# Patient Record
Sex: MALE | Race: BLACK OR AFRICAN AMERICAN
[De-identification: names, ages, dates, MRNs, and addresses within clinical notes are randomized per-mention and may not be internally consistent; named-entity substitution may affect disease eponyms.]

---

## 2020-02-10 ENCOUNTER — HOSPITAL ENCOUNTER (EMERGENCY)
Dept: HOSPITAL 74 - FER | Age: 25
Discharge: HOME | End: 2020-02-10
Payer: COMMERCIAL

## 2020-02-10 VITALS — HEART RATE: 60 BPM | DIASTOLIC BLOOD PRESSURE: 77 MMHG | SYSTOLIC BLOOD PRESSURE: 129 MMHG

## 2020-02-10 VITALS — BODY MASS INDEX: 31.5 KG/M2

## 2020-02-10 VITALS — TEMPERATURE: 98.4 F

## 2020-02-10 DIAGNOSIS — R11.2: Primary | ICD-10-CM

## 2020-02-10 DIAGNOSIS — Z91.013: ICD-10-CM

## 2020-02-10 DIAGNOSIS — R19.7: ICD-10-CM

## 2020-02-10 NOTE — PDOC
Attending Attestation





- Resident


Resident Name: SkyDejan





- ED Attending Attestation


I have performed the following: I have examined & evaluated the patient, The 

case was reviewed & discussed with the resident, I agree w/resident's findings 

& plan, Exceptions are as noted





- HPI


HPI: 





02/10/20 14:15


Healthy male with 4-day history of nausea vomiting and diarrhea.  He has been 

able to tolerate small amounts of p.o. fluid, but any food intake precipitates 

vomiting and diarrhea.  No lightheadedness, dizziness, abdominal pain, 

hematemesis, melena, bloody stool, fever or chills.





- Physicial Exam


PE: 





02/10/20 14:16


PE: Afebrile, vital signs stable including orthostatics


HEENT normal


Neck supple without bruit mass or nodes


Chest clear


CV regular without murmur rub or gallop.  No tachycardia


Abdomen nondistended.  Bowel sounds normal.  Soft without mass tenderness 

organomegaly


Adequate skin turgor, wet mucous membranes





- Medical Decision Making





02/10/20 14:17


Assessment: Viral gastroenteritis, no acute abdominal pain, no sign of 

significant dehydration





Plan: Antiemetic and antidiarrheal, p.o. hydration, observe.


02/10/20 15:11


Tolerating p.o. fluids and applesauce, no vomiting.  Mild nausea, however, 

persists.  Abdomen remains soft and nontender


Discharged on clear liquids, Zofran, and Imodium.  Return to ER if there is 

fever or abdominal pain.  Otherwise follow-up primary physician.  Fully 

ambulatory and in no severe distress at discharge to follow-up as directed

## 2020-02-10 NOTE — PDOC
History of Present Illness





- General


Chief Complaint: Vomiting/Diarrhea


Stated Complaint: diarrhea/vomiting


Time Seen by Provider: 02/10/20 13:16


History Source: Patient





- History of Present Illness


Initial Comments: 





Mr. Metz is a 25 y/o man w/hx sickle cell trait p/w four days of ongoing 

N/V/D. He reports x3 nbnb vomiting as well as x3 non-bloody diarrhea yesterday. 

He denies any fevers, chills, sick contacts. He denies any street food or new 

foods outside his typical diet. He reports difficulty tolerating food po but has

been tolerating small amounts of water. 














Past History





- Past Medical History


Allergies/Adverse Reactions: 


                                    Allergies











Allergy/AdvReac Type Severity Reaction Status Date / Time


 


shellfish derived AdvReac Unknown  Verified 03/02/20 15:26











Home Medications: 


Ambulatory Orders





Loperamide HCl [Imodium -] 2 mg PO DAILY #7 capsule 02/10/20 


Ondansetron [Zofran *Odt*] 8 mg SL TID #9 od.tablet 02/10/20 








COPD: No


Other medical history: pt denies





- Psycho Social/Smoking Cessation Hx


Smoking History: Never smoked


Hx Alcohol Use: No


Drug/Substance Use Hx: Yes (Marijuana)





**Review of Systems





- Review of Systems


Able to Perform ROS?: Yes


Comments:: 





ROS: 


GENERAL/CONSTITUTIONAL: No fever or chills. No weakness.


HEAD, EYES, EARS, NOSE AND THROAT: No change in vision. No ear pain or 

discharge. No sore throat.


CARDIOVASCULAR: No chest pain or shortness of breath


RESPIRATORY: No cough, wheezing, or hemoptysis.


GASTROINTESTINAL: Nausea, vomiting, diarrhea. No constipation.


GENITOURINARY: No dysuria, frequency, or change in urination.


MUSCULOSKELETAL: No joint or muscle swelling or pain. No neck or back pain.


SKIN: No rash


NEUROLOGIC: No headache, vertigo, loss of consciousness, or change in 

strength/sensation.


ENDOCRINE: No increased thirst. No abnormal weight change


HEMATOLOGIC/LYMPHATIC: No anemia, easy bleeding, or history of blood clots.


ALLERGIC/IMMUNOLOGIC: No hives or skin allergy.











*Physical Exam





- Vital Signs


                                Last Vital Signs











Temp Pulse Resp BP Pulse Ox


 


 98.4 F   70   18   132/84   100 


 


 02/10/20 13:12  02/10/20 13:12  02/10/20 13:12  02/10/20 13:12  02/10/20 13:12














- Physical Exam





PE: 


GENERAL: Awake, alert, and fully oriented, in no acute distress


HEAD: No signs of trauma, normocephalic, atraumatic 


EYES: PERRLA, EOMI, sclera anicteric, conjunctiva clear


ENT: Auricles normal inspection, hearing grossly normal, nares patent, 

oropharynx clear without exudates. Moist mucosa


NECK: Normal ROM, supple, no lymphadenopathy, JVD, or masses


LUNGS: No distress, speaks full sentences, clear to auscultation bilaterally 


HEART: Regular rate and rhythm, normal S1 and S2, no murmurs, rubs or gallops, 

peripheral pulses normal and equal bilaterally. 


ABDOMEN: Soft, nontender, normoactive bowel sounds.  No guarding, no rebound.  

No masses


EXTREMITIES : Normal inspection, Normal range of motion, no edema.  No clubbing 

or cyanosis


NEUROLOGICAL: Cranial nerves II through XII grossly intact.  Normal speech, 

normal gait, no focal sensorimotor deficits 


SKIN: Warm, Dry, normal turgor, no rashes or lesions noted

















Medical Decision Making





- Medical Decision Making





02/10/20 14:08


24M w/hx sickle cell trait p/w 4 days of nbnb N/V/D, unable to tolerate po at 

home, afebrile w/stable vitals. Ddx includes viral infection, bacterial less 

likely given short course without fever. 





Plan: 


Zofran


Imodium


Po challenge


orthostatic vitals





Dispo: 


Discharge





02/10/20 14:47


Po challenge in progress s/p medications








02/10/20 15:12


Orthostatics: 


60 laying down, 75 standing


123/79 laying, 133/77 standing





---


On reassessment, he reports feeling improved. Plan for discharge with imodium, 

zofran prescription, PCP follow up














Discharge





- Discharge Information


Problems reviewed: Yes


Clinical Impression/Diagnosis: 


Nausea & vomiting


Qualifiers:


 Vomiting type: unspecified Vomiting Intractability: non-intractable Qualified 

Code(s): R11.2 - Nausea with vomiting, unspecified





Diarrhea


Qualifiers:


 Diarrhea type: unspecified type Qualified Code(s): R19.7 - Diarrhea, 

unspecified





Condition: Fair


Disposition: HOME





- Admission


No





- Additional Discharge Information


Prescriptions: 


Loperamide HCl [Imodium -] 2 mg PO DAILY #7 capsule


Ondansetron [Zofran *Odt*] 8 mg SL TID #9 od.tablet





- Follow up/Referral





- Patient Discharge Instructions


Patient Printed Discharge Instructions:  DI for Diarrhea and Traveler's Diarrhea

-- Adult, Nausea and Vomiting-Adult


Additional Instructions: 


You were seen in the ER for nausea, vomiting, diarrhea and being unable to eat 

without vomiting. We gave you medication to help with these symptoms, and you 

were able to eat in the ER. We are prescribing you more doses of these 

medications to take at home. Your symptoms may be caused by a virus, in that 

case they should resolve over the next few days. For now maintain a liquid diet,

and slowly progress to more solid foods. Please follow up with your primary care

provider as soon as possible, in the next 7 days. If you do not have one, we are

giving a referral to the [facility]. Please return to [facility] ER if you 

develop high fevers, intractable nausea and vomiting, weakness, chest pain, or 

trouble breathing. 





- Post Discharge Activity

## 2020-03-02 ENCOUNTER — HOSPITAL ENCOUNTER (EMERGENCY)
Dept: HOSPITAL 74 - JER | Age: 25
Discharge: HOME | End: 2020-03-02
Payer: COMMERCIAL

## 2020-03-02 VITALS — SYSTOLIC BLOOD PRESSURE: 162 MMHG | HEART RATE: 84 BPM | DIASTOLIC BLOOD PRESSURE: 89 MMHG

## 2020-03-02 VITALS — TEMPERATURE: 97 F

## 2020-03-02 VITALS — BODY MASS INDEX: 28.7 KG/M2

## 2020-03-02 DIAGNOSIS — R10.12: Primary | ICD-10-CM

## 2020-03-02 DIAGNOSIS — Z91.013: ICD-10-CM

## 2020-03-02 LAB
ALBUMIN SERPL-MCNC: 4.4 G/DL (ref 3.4–5)
ALP SERPL-CCNC: 68 U/L (ref 45–117)
ALT SERPL-CCNC: 42 U/L (ref 13–61)
ANION GAP SERPL CALC-SCNC: 5 MMOL/L (ref 8–16)
APPEARANCE UR: CLEAR
AST SERPL-CCNC: 77 U/L (ref 15–37)
BACTERIA # UR AUTO: 5.7 /HPF
BASOPHILS # BLD: 0.5 % (ref 0–2)
BILIRUB SERPL-MCNC: 1.6 MG/DL (ref 0.2–1)
BILIRUB UR STRIP.AUTO-MCNC: NEGATIVE MG/DL
BUN SERPL-MCNC: 12.4 MG/DL (ref 7–18)
CALCIUM SERPL-MCNC: 9.7 MG/DL (ref 8.5–10.1)
CASTS URNS QL MICRO: 9 /LPF (ref 0–8)
CHLORIDE SERPL-SCNC: 105 MMOL/L (ref 98–107)
CO2 SERPL-SCNC: 29 MMOL/L (ref 21–32)
COLOR UR: YELLOW
CREAT SERPL-MCNC: 1.2 MG/DL (ref 0.55–1.3)
DEPRECATED RDW RBC AUTO: 13 % (ref 11.9–15.9)
EOSINOPHIL # BLD: 2.6 % (ref 0–4.5)
EPITH CASTS URNS QL MICRO: 4.8 /HPF
GLUCOSE SERPL-MCNC: 81 MG/DL (ref 74–106)
HCT VFR BLD CALC: 47.1 % (ref 35.4–49)
HGB BLD-MCNC: 15.9 GM/DL (ref 11.7–16.9)
KETONES UR QL STRIP: NEGATIVE
LEUKOCYTE ESTERASE UR QL STRIP.AUTO: (no result)
LIPASE SERPL-CCNC: 90 U/L (ref 73–393)
LYMPHOCYTES # BLD: 30 % (ref 8–40)
MCH RBC QN AUTO: 31 PG (ref 25.7–33.7)
MCHC RBC AUTO-ENTMCNC: 33.8 G/DL (ref 32–35.9)
MCV RBC: 91.7 FL (ref 80–96)
MONOCYTES # BLD AUTO: 9.4 % (ref 3.8–10.2)
NEUTROPHILS # BLD: 57.5 % (ref 42.8–82.8)
NITRITE UR QL STRIP: NEGATIVE
PH UR: 5.5 [PH] (ref 5–8)
PLATELET # BLD AUTO: 162 K/MM3 (ref 134–434)
PMV BLD: 10.3 FL (ref 7.5–11.1)
POTASSIUM SERPLBLD-SCNC: 3.8 MMOL/L (ref 3.5–5.1)
PROT SERPL-MCNC: 7.8 G/DL (ref 6.4–8.2)
PROT UR QL STRIP: NEGATIVE
PROT UR QL STRIP: NEGATIVE
RBC # BLD AUTO: 1 /HPF (ref 0–4)
RBC # BLD AUTO: 5.13 M/MM3 (ref 4–5.6)
SODIUM SERPL-SCNC: 139 MMOL/L (ref 136–145)
SP GR UR: 1.02 (ref 1.01–1.03)
UROBILINOGEN UR STRIP-MCNC: 1 MG/DL (ref 0.2–1)
WBC # BLD AUTO: 4.8 K/MM3 (ref 4–10)
WBC # UR AUTO: 84 /HPF (ref 0–5)

## 2023-01-01 NOTE — PDOC
History of Present Illness





- General


Chief Complaint: Pain


Stated Complaint: STOMACH/BACK PAIN


Time Seen by Provider: 03/02/20 15:21


History Source: Patient





- History of Present Illness


Timing/Duration: reports: other


Abdominal Pain Onset Location: reports: flank


Pain Radiation: reports: back





Past History





- Past Medical History


Allergies/Adverse Reactions: 


 Allergies











Allergy/AdvReac Type Severity Reaction Status Date / Time


 


shellfish derived AdvReac Unknown  Verified 03/02/20 15:26











Home Medications: 


Ambulatory Orders





Loperamide HCl [Imodium -] 2 mg PO DAILY #7 capsule 02/10/20 


Ondansetron [Zofran *Odt*] 8 mg SL TID #9 od.tablet 02/10/20 








COPD: No





- Psycho Social/Smoking Cessation Hx


Smoking History: Never smoked


Information on smoking cessation initiated: No


Hx Alcohol Use: No


Drug/Substance Use Hx: No





**Review of Systems





- Review of Systems


Constitutional: No: Chills, Fever


ABD/GI: No: Blood Streaked Bowels, Constipated, Diarrhea, Nausea, Rectal 

Bleeding, Vomiting, Tarry Stools


: Yes: Flank Pain.  No: Burning, Dysuria, Discharge, Hematuria





*Physical Exam





- Vital Signs


 Last Vital Signs











Temp Pulse Resp BP Pulse Ox


 


 97 F L  80   16   132/67   98 


 


 03/02/20 15:26  03/02/20 15:26  03/02/20 15:26  03/02/20 15:26  03/02/20 15:26














- Physical Exam


General Appearance: Yes: Appropriately Dressed.  No: Apparent Distress


HEENT: positive: Normal Voice


Neck: positive: Supple


Respiratory/Chest: positive: Lungs Clear, Normal Breath Sounds.  negative: 

Respiratory Distress


Cardiovascular: positive: Regular Rate, S1, S2


Gastrointestinal/Abdominal: positive: Soft.  negative: Tender


Musculoskeletal: negative: CVA Tenderness, Vertebral Tenderness


Extremity: positive: Normal Inspection


Integumentary: positive: Dry, Warm


Neurologic: positive: Fully Oriented, Alert, Normal Mood/Affect





ED Treatment Course





- LABORATORY


CBC & Chemistry Diagram: 


 03/02/20 16:19





 03/02/20 16:19





Medical Decision Making





- Medical Decision Making





03/02/20 15:49





24-year-old male, no pmhx, here w/ LUQ pain radiating to back x2 days, 6 out of 

10, sharp and constant and possibly worse w/ po intake. No nausea, vomiting 

dysuria, hematuria, change in bowel movements fever or chills.  No history of 

similar pain.  No trauma or other obvious inciting factors. Denies excessive 

ETOH or NSAID use





see exam





L flank pain


Low suspicion for renal stone, ? gastritis given worsening w/ po intake, 

unlikely biliary, uti/pyelo and NT over mcburneys


Exam unremarkable


No sig pain in facility


-Labs/ua


-Renal US


-Anticipate dc w/ PMD f/u








03/02/20 17:51


Labs, UA and ultrasound unremarkable.  Will dc to take Tylenol Motrin for pain 

and follow-up with PMD











Discharge





- Discharge Information


Problems reviewed: Yes


Clinical Impression/Diagnosis: 


 Left flank pain





Disposition: HOME





- Follow up/Referral





- Patient Discharge Instructions


Additional Instructions: 


The cause of your pain is unclear at this time as your labs and ultrasound were 

normal


Please follow-up with your PMD if pain persists





- Post Discharge Activity 0 Minute(s)